# Patient Record
Sex: MALE | Employment: UNEMPLOYED | URBAN - METROPOLITAN AREA
[De-identification: names, ages, dates, MRNs, and addresses within clinical notes are randomized per-mention and may not be internally consistent; named-entity substitution may affect disease eponyms.]

---

## 2018-09-04 ENCOUNTER — TRANSFERRED RECORDS (OUTPATIENT)
Dept: HEALTH INFORMATION MANAGEMENT | Facility: CLINIC | Age: 48
End: 2018-09-04

## 2018-09-27 ENCOUNTER — TRANSFERRED RECORDS (OUTPATIENT)
Dept: HEALTH INFORMATION MANAGEMENT | Facility: CLINIC | Age: 48
End: 2018-09-27

## 2018-10-08 NOTE — TELEPHONE ENCOUNTER
FUTURE VISIT INFORMATION      FUTURE VISIT INFORMATION:    Date: 10/16/18    Time: 800am    Location: [facility]  REFERRAL INFORMATION:    Referring provider:  LOVE BARNES    Referring providers clinic:  [facility]    Reason for visit/diagnosis  Ptosis repair    RECORDS REQUESTED FROM:       Clinic name Comments Records Status Imaging Status   [facility] Notes sent to HIM on 10/8/18 Sent to HIM

## 2018-10-16 ENCOUNTER — PRE VISIT (OUTPATIENT)
Dept: OPHTHALMOLOGY | Facility: CLINIC | Age: 48
End: 2018-10-16

## 2018-11-15 ENCOUNTER — OFFICE VISIT (OUTPATIENT)
Dept: OPHTHALMOLOGY | Facility: CLINIC | Age: 48
End: 2018-11-15
Payer: COMMERCIAL

## 2018-11-15 ENCOUNTER — DOCUMENTATION ONLY (OUTPATIENT)
Dept: OPHTHALMOLOGY | Facility: CLINIC | Age: 48
End: 2018-11-15

## 2018-11-15 VITALS — HEIGHT: 73 IN | BODY MASS INDEX: 19.88 KG/M2 | WEIGHT: 150 LBS

## 2018-11-15 DIAGNOSIS — G71.00 MUSCULAR DYSTROPHY (H): ICD-10-CM

## 2018-11-15 DIAGNOSIS — H02.403 PTOSIS OF BOTH EYELIDS: Primary | ICD-10-CM

## 2018-11-15 RX ORDER — MIRTAZAPINE 30 MG/1
30 TABLET, FILM COATED ORAL AT BEDTIME
Refills: 4 | COMMUNITY
Start: 2018-02-19

## 2018-11-15 RX ORDER — GLIMEPIRIDE 2 MG/1
2 TABLET ORAL
Refills: 2 | COMMUNITY
Start: 2017-11-26

## 2018-11-15 RX ORDER — FLUTICASONE PROPIONATE 50 MCG
SPRAY, SUSPENSION (ML) NASAL
Refills: 0 | COMMUNITY
Start: 2018-01-10

## 2018-11-15 RX ORDER — GABAPENTIN 300 MG/1
600 CAPSULE ORAL DAILY
Refills: 3 | COMMUNITY
Start: 2017-11-21

## 2018-11-15 RX ORDER — ATORVASTATIN CALCIUM 40 MG/1
TABLET, FILM COATED ORAL DAILY
Refills: 3 | COMMUNITY
Start: 2017-11-21

## 2018-11-15 ASSESSMENT — TONOMETRY
OD_IOP_MMHG: 15
OS_IOP_MMHG: 12
IOP_METHOD: ICARE

## 2018-11-15 ASSESSMENT — VISUAL ACUITY
OS_CC+: -2
OD_CC+: -1
METHOD: SNELLEN - LINEAR
CORRECTION_TYPE: GLASSES
OD_CC: 20/125
OS_CC: 20/40

## 2018-11-15 ASSESSMENT — CONF VISUAL FIELD
OD_SUPERIOR_TEMPORAL_RESTRICTION: 3
OD_SUPERIOR_NASAL_RESTRICTION: 3
OS_SUPERIOR_TEMPORAL_RESTRICTION: 3
OD_INFERIOR_NASAL_RESTRICTION: 3
OD_INFERIOR_TEMPORAL_RESTRICTION: 3
OS_INFERIOR_NASAL_RESTRICTION: 3

## 2018-11-15 ASSESSMENT — EXTERNAL EXAM - RIGHT EYE: OD_EXAM: SIGNIFICANT FRONTALIS RECRUITMENT

## 2018-11-15 ASSESSMENT — EXTERNAL EXAM - LEFT EYE: OS_EXAM: SIGNIFICANT FRONTALIS RECRUITMENT

## 2018-11-15 ASSESSMENT — SLIT LAMP EXAM - LIDS
COMMENTS: DERMATOCHALASIS, PTOSIS
COMMENTS: DERMATOCHALASIS, PTOSIS

## 2018-11-15 NOTE — PROGRESS NOTES
Met with patient to schedule surgery with Dr. Jayden Feldman.    Surgery was scheduled on 12/13 at [facility].    Patient will have H&P at [facility]  Post-Op care appointment was scheduled on 12/28  Patient is aware a / is needed day of surgery.   Patient received surgery packet has my direct contact information for any further questions.

## 2018-11-15 NOTE — PROGRESS NOTES
Oculoplastic Clinic New Patient    Patient: Catrachito Palmer MRN# [id number]   YOB: 1970 Age: 48 year old   Date of Visit: Nov 15, 2018    CC: Droopy eyelids obstructing vision.  Chief Complaints and History of Present Illnesses   Patient presents with     Droopy Eye Lid             HPI:     Catrachito Palmer is a 48 year old male who has noted gradual onset of droopy eyelids over the past years. Referred by Dr. Mendez, optometrist at [facility]. The droopy eyelid is interfering with activities of daily living including driving, and reading. The patient denies double vision, variability of the eyelid position, or dry eye symptoms. Patient with history of muscular dystrophy. He lifts his eyelids with his fingers to see.     History of VIVIAN injury years ago, pt poor historian, wound was not sutured.  CEIOL each eye 2015.    EXAM:   MRD1: 0/0 mm  Levator function 5/5    VISUAL FIELD:  Right eye untaped: 7 degrees Right eye taped: 48 degrees  Left eye untaped: 10 degrees Left eye taped: 43 degrees    Assessment & Plan     Catrachito Palmer is a 48 year old male with the following diagnoses:   1. Ptosis of both eyelids    2. Muscular dystrophy       Bilateral ptosis repair with frontalis sling (small skin ellipse)      Discussed r/b/a  ANTICOAGULATION:  None     PHOTOS DEMONSTRATE:  Myogenic blepharoptosis      Nelia Last MD  Ophthalmology Resident, PGY-2    Attending Physician Attestation:  Complete documentation of historical and exam elements from today's encounter can be found in the full encounter summary report (not reduplicated in this progress note).  I personally obtained the chief complaint(s) and history of present illness.  I confirmed and edited as necessary the review of systems, past medical/surgical history, family history, social history, and examination findings as documented by others; and I examined the patient myself.  I personally reviewed the relevant tests, images, and reports  as documented above.  I formulated and edited as necessary the assessment and plan and discussed the findings and management plan with the patient and family. - Jayden Feldman MD          Today with Catrachito Palmer, I reviewed the indications, risks, benefits, and alternatives of the proposed surgical procedure including, but not limited to, failure obtain the desired result  and need for additional surgery, bleeding, infection, loss of vision, loss of the eye, and the remote possibility of permanent damage to any organ system or death with the use of anesthesia.  I provided multiple opportunities for the questions, answered all questions to the best of my ability, and confirmed that my answers and my discussion were understood.

## 2018-11-15 NOTE — NURSING NOTE
Chief Complaints and History of Present Illnesses   Patient presents with     Droopy Eye Lid     HPI    Affected eye(s):  Both   Location:  Upper   Symptoms:     No blurred vision   No double vision   No tearing   No Dryness         Do you have eye pain now?:  No      Comments:    Droopy BUL since 1995, gradually getting worse over time, affecting vision, difficult to read, drive, and watch TV    Manuela Aparicio November 15, 2018 10:16 AM

## 2018-11-15 NOTE — LETTER
11/15/2018         RE:  :  MRN: Catrachito Palmer  1970  [id number]     Dear Dr. Mendez,    Thank you for asking me to see your patient, Catrachito Palmer, for an oculoplastic   consultation.  My assessment and plan are below.  For further details, please see my attached clinic note.             HPI:     Catrachito Palmer is a 48 year old male who has noted gradual onset of droopy eyelids over the past years. Referred by Dr. Mendez, optometrist at [facility]. The droopy eyelid is interfering with activities of daily living including driving, and reading. The patient denies double vision, variability of the eyelid position, or dry eye symptoms. Patient with history of muscular dystrophy. He lifts his eyelids with his fingers to see.     History of VIVIAN injury years ago, pt poor historian, wound was not sutured.  CEIOL each eye .    EXAM:   MRD1: 0/0 mm  Levator function 5/5    VISUAL FIELD:  Right eye untaped: 7 degrees Right eye taped: 48 degrees  Left eye untaped: 10 degrees Left eye taped: 43 degrees    Assessment & Plan     Catrachito Palmer is a 48 year old male with the following diagnoses:   1. Ptosis of both eyelids    2. Muscular dystrophy       Bilateral ptosis repair with frontalis sling (small skin ellipse)      Discussed r/b/a  ANTICOAGULATION:  None      Again, thank you for allowing me to participate in the care of your patient.      Sincerely,    Jayden Feldman MD  Department of Ophthalmology and Visual Neurosciences  [facility]    CC: Sylvain Mendez,   [facility]  [address] [place] [address]  VIA Facsimile: [phone]

## 2018-11-15 NOTE — MR AVS SNAPSHOT
"              After Visit Summary   11/15/2018    Catrachito Palmer    MRN: [id number]           Patient Information     Date Of Birth          1970        Visit Information        Provider Department      11/15/2018 10:00 AM Jayden Feldman MD [facility]        Today's Diagnoses     Ptosis of both eyelids    -  1    Muscular dystrophy           Follow-ups after your visit        Your next 10 appointments already scheduled     Dec 28, 2018 11:00 AM CST   (Arrive by 10:45 AM)   Post-Op with MD THANG [facility]hthal[facility] ([facility])    [address] [place] [id number] [phone]              Who to contact     Please call your clinic at [phone] to:    Ask questions about your health    Make or cancel appointments    Discuss your medicines    Learn about your test results    Speak to your doctor            Additional Information About Your Visit        MyChart Information     [employer] is an electronic gateway that provides easy, online access to your medical records. With "[employer] you can request a clinic appointment, read your test results, renew a prescription or communicate with your care team.     To sign up for [employer] visit the website at [email or web address]   You will be asked to enter the access code listed below, as well as some personal information. Please follow the directions to create your username and password.     Your access code is: [email or web address]  Expires: 2018  5:31 AM     Your access code will  in 90 days. If you need help or a new code, please contact your [facility] or call [phone] for assistance.        Care EveryWhere ID     This is your Care EveryWhere ID. This could be used by other organizations to access your [place] medical records  [id number]        Your Vitals Were     Height BMI (Body Mass Index)                1.854 m (6' 1\") 19.79 " kg/m2           Blood Pressure from Last 3 Encounters:   No data found for BP    Weight from Last 3 Encounters:   11/15/18 68 kg (150 lb)              We Performed the Following     External Photos OU (both eyes)     Melton VF Ptosis OU     Callie-Operative Worksheet (Plastics)        Primary Care Provider    None Specified       No primary provider on file.        Equal Access to Services     [facility] BRUNA : Hadii aad ku hadasho Soomaali, waaxda luqadaha, qaybta kaalmada bethda, dayo brunoshawndinorah sol . So [facility] [phone].    ATENCIÓN: Si habla español, tiene a tom disposición servicios gratuitos de asistencia lingüística. Llame al [phone].    We comply with applicable federal civil rights laws and [place] laws. We do not discriminate on the basis of race, color, national origin, age, disability, sex, sexual orientation, or gender identity.            Thank you!     Thank you for choosing [facility] OPHTHALMOLOGY  for your care. Our goal is always to provide you with excellent care. Hearing back from our patients is one way we can continue to improve our services. Please take a few minutes to complete the written survey that you may receive in the mail after your visit with us. Thank you!             Your Updated Medication List - Protect others around you: Learn how to safely use, store and throw away your medicines at www.disposemymeds.org.          This list is accurate as of 11/15/18 11:19 AM.  Always use your most recent med list.                   Brand Name Dispense Instructions for use Diagnosis    atorvastatin 40 MG tablet    LIPITOR          fluticasone 50 MCG/ACT spray    FLONASE     SHAKE LQ AND U 2 SPRAYS IEN D        gabapentin 300 MG capsule    NEURONTIN          glimepiride 2 MG tablet    AMARYL          GLIPIZIDE PO           METFORMIN HCL PO           mirtazapine 30 MG tablet    REMERON

## 2018-12-12 ENCOUNTER — ANESTHESIA EVENT (OUTPATIENT)
Dept: SURGERY | Facility: AMBULATORY SURGERY CENTER | Age: 48
End: 2018-12-12

## 2018-12-13 ENCOUNTER — ANESTHESIA (OUTPATIENT)
Dept: SURGERY | Facility: AMBULATORY SURGERY CENTER | Age: 48
End: 2018-12-13

## 2018-12-13 ENCOUNTER — HOSPITAL ENCOUNTER (OUTPATIENT)
Facility: AMBULATORY SURGERY CENTER | Age: 48
End: 2018-12-13
Attending: OPHTHALMOLOGY
Payer: COMMERCIAL

## 2018-12-13 VITALS
TEMPERATURE: 97.1 F | OXYGEN SATURATION: 100 % | DIASTOLIC BLOOD PRESSURE: 77 MMHG | SYSTOLIC BLOOD PRESSURE: 141 MMHG | RESPIRATION RATE: 14 BRPM

## 2018-12-13 DIAGNOSIS — Z98.890 POSTOPERATIVE EYE STATE: Primary | ICD-10-CM

## 2018-12-13 LAB — GLUCOSE BLDC GLUCOMTR-MCNC: 80 MG/DL (ref 70–99)

## 2018-12-13 DEVICE — EYE IMP FRONTALIS PTOSIS SET 585192: Type: IMPLANTABLE DEVICE | Site: EYELID | Status: FUNCTIONAL

## 2018-12-13 RX ORDER — SODIUM CHLORIDE, SODIUM LACTATE, POTASSIUM CHLORIDE, CALCIUM CHLORIDE 600; 310; 30; 20 MG/100ML; MG/100ML; MG/100ML; MG/100ML
INJECTION, SOLUTION INTRAVENOUS CONTINUOUS
Status: DISCONTINUED | OUTPATIENT
Start: 2018-12-13 | End: 2018-12-13 | Stop reason: HOSPADM

## 2018-12-13 RX ORDER — PROPOFOL 10 MG/ML
INJECTION, EMULSION INTRAVENOUS PRN
Status: DISCONTINUED | OUTPATIENT
Start: 2018-12-13 | End: 2018-12-13

## 2018-12-13 RX ORDER — MEPERIDINE HYDROCHLORIDE 25 MG/ML
12.5 INJECTION INTRAMUSCULAR; INTRAVENOUS; SUBCUTANEOUS
Status: DISCONTINUED | OUTPATIENT
Start: 2018-12-13 | End: 2018-12-14 | Stop reason: HOSPADM

## 2018-12-13 RX ORDER — NALOXONE HYDROCHLORIDE 0.4 MG/ML
.1-.4 INJECTION, SOLUTION INTRAMUSCULAR; INTRAVENOUS; SUBCUTANEOUS
Status: DISCONTINUED | OUTPATIENT
Start: 2018-12-13 | End: 2018-12-14 | Stop reason: HOSPADM

## 2018-12-13 RX ORDER — OXYCODONE HYDROCHLORIDE 5 MG/1
5 TABLET ORAL EVERY 4 HOURS PRN
Status: DISCONTINUED | OUTPATIENT
Start: 2018-12-13 | End: 2018-12-14 | Stop reason: HOSPADM

## 2018-12-13 RX ORDER — GENTAMICIN 40 MG/ML
INJECTION, SOLUTION INTRAMUSCULAR; INTRAVENOUS CONTINUOUS PRN
Status: COMPLETED | OUTPATIENT
Start: 2018-12-13 | End: 2018-12-13

## 2018-12-13 RX ORDER — ERYTHROMYCIN 5 MG/G
OINTMENT OPHTHALMIC PRN
Status: DISCONTINUED | OUTPATIENT
Start: 2018-12-13 | End: 2018-12-13 | Stop reason: HOSPADM

## 2018-12-13 RX ORDER — LIDOCAINE 40 MG/G
CREAM TOPICAL
Status: DISCONTINUED | OUTPATIENT
Start: 2018-12-13 | End: 2018-12-13 | Stop reason: HOSPADM

## 2018-12-13 RX ORDER — LIDOCAINE HYDROCHLORIDE 20 MG/ML
INJECTION, SOLUTION INFILTRATION; PERINEURAL PRN
Status: DISCONTINUED | OUTPATIENT
Start: 2018-12-13 | End: 2018-12-13

## 2018-12-13 RX ORDER — ONDANSETRON 4 MG/1
4 TABLET, ORALLY DISINTEGRATING ORAL EVERY 30 MIN PRN
Status: DISCONTINUED | OUTPATIENT
Start: 2018-12-13 | End: 2018-12-14 | Stop reason: HOSPADM

## 2018-12-13 RX ORDER — HYDROMORPHONE HYDROCHLORIDE 1 MG/ML
.3-.5 INJECTION, SOLUTION INTRAMUSCULAR; INTRAVENOUS; SUBCUTANEOUS EVERY 10 MIN PRN
Status: DISCONTINUED | OUTPATIENT
Start: 2018-12-13 | End: 2018-12-14 | Stop reason: HOSPADM

## 2018-12-13 RX ORDER — ERYTHROMYCIN 5 MG/G
OINTMENT OPHTHALMIC
Qty: 3.5 G | Refills: 0 | Status: SHIPPED | OUTPATIENT
Start: 2018-12-13 | End: 2018-12-23

## 2018-12-13 RX ORDER — CEPHALEXIN 500 MG/1
500 CAPSULE ORAL 2 TIMES DAILY
Qty: 20 CAPSULE | Refills: 0 | Status: SHIPPED | OUTPATIENT
Start: 2018-12-13 | End: 2018-12-23

## 2018-12-13 RX ORDER — FENTANYL CITRATE 50 UG/ML
25-50 INJECTION, SOLUTION INTRAMUSCULAR; INTRAVENOUS
Status: DISCONTINUED | OUTPATIENT
Start: 2018-12-13 | End: 2018-12-13 | Stop reason: HOSPADM

## 2018-12-13 RX ORDER — GABAPENTIN 300 MG/1
300 CAPSULE ORAL ONCE
Status: COMPLETED | OUTPATIENT
Start: 2018-12-13 | End: 2018-12-13

## 2018-12-13 RX ORDER — HYDROCODONE BITARTRATE AND ACETAMINOPHEN 5; 325 MG/1; MG/1
1 TABLET ORAL
Status: DISCONTINUED | OUTPATIENT
Start: 2018-12-13 | End: 2018-12-14 | Stop reason: HOSPADM

## 2018-12-13 RX ORDER — ONDANSETRON 2 MG/ML
4 INJECTION INTRAMUSCULAR; INTRAVENOUS EVERY 30 MIN PRN
Status: DISCONTINUED | OUTPATIENT
Start: 2018-12-13 | End: 2018-12-14 | Stop reason: HOSPADM

## 2018-12-13 RX ORDER — ACETAMINOPHEN 325 MG/1
975 TABLET ORAL ONCE
Status: COMPLETED | OUTPATIENT
Start: 2018-12-13 | End: 2018-12-13

## 2018-12-13 RX ORDER — SODIUM CHLORIDE, SODIUM LACTATE, POTASSIUM CHLORIDE, CALCIUM CHLORIDE 600; 310; 30; 20 MG/100ML; MG/100ML; MG/100ML; MG/100ML
INJECTION, SOLUTION INTRAVENOUS CONTINUOUS
Status: DISCONTINUED | OUTPATIENT
Start: 2018-12-13 | End: 2018-12-14 | Stop reason: HOSPADM

## 2018-12-13 RX ADMIN — LIDOCAINE HYDROCHLORIDE 60 MG: 20 INJECTION, SOLUTION INFILTRATION; PERINEURAL at 09:35

## 2018-12-13 RX ADMIN — ACETAMINOPHEN 975 MG: 325 TABLET ORAL at 07:56

## 2018-12-13 RX ADMIN — PROPOFOL 30 MG: 10 INJECTION, EMULSION INTRAVENOUS at 09:37

## 2018-12-13 RX ADMIN — PROPOFOL 50 MG: 10 INJECTION, EMULSION INTRAVENOUS at 09:35

## 2018-12-13 RX ADMIN — GABAPENTIN 300 MG: 300 CAPSULE ORAL at 07:56

## 2018-12-13 RX ADMIN — SODIUM CHLORIDE, SODIUM LACTATE, POTASSIUM CHLORIDE, CALCIUM CHLORIDE: 600; 310; 30; 20 INJECTION, SOLUTION INTRAVENOUS at 07:57

## 2018-12-13 ASSESSMENT — LIFESTYLE VARIABLES: TOBACCO_USE: 1

## 2018-12-13 NOTE — OP NOTE
PREOPERATIVE DIAGNOSIS: Bilateral severe myogenic ptosis.   POSTOPERATIVE DIAGNOSIS: Bilateral severe myogenic ptosis.   PROCEDURES PERFORMED: Bilateral upper eyelid ptosis repair with frontalis silicone sling.   SURGEON: Jayden Feldman MD  ASSISTANTS: Emiliano Turner MD  ANESTHESIA: MAC with local infiltration of a 50/50 mixture of 1.5% lidocaine with epinephrine and 0.5% Marcaine.   COMPLICATIONS: None.   ESTIMATED BLOOD LOSS: Less than 5 mL.   HISTORY:  Catrachito Palmer presents today with severe upper eyelid ptosis interfering with the superior visual field and activities of daily living. After the risks, benefits and alternatives to the proposed procedure were explained, informed consent was obtained.   DESCRIPTION OF PROCEDURE: Catrachito Palmer  was brought to the operating room and placed supine on the operating table. Under MAC anesthesia, the bilateral upper lid crease and an ellipse of excess skin and central brow were marked with a marking pen and infiltrated with local anesthetic. The area  was prepped and draped in the typical sterile ophthalmic fashion. Attention was directed to the right  side. The ellipse that had been marked out was incised with a 15 blade and dissection carried down to the orbicularis with high temperature cautery. Dissection was carried to the superior tarsal plate. The brow incision was made with a 15 blade and deepened with the Ulloa scissors, a subcutaneous pocket was dissected superiorly with the Ulloa scissors. A [employer] wallace silicone sling set was used. The preplaced needles were trimmed off and the silicone secured to the superior tarsal plate medially and laterally with 5-0 Mersilene sutures. Each end of the sling was then passed through the upper eyelid tissues in a post-septal plane to the brow incision using a curved abdominal closure needle. The upper eyelid crease incision was closed with interrupted buried deep 6-0 Vicryl sutures taking bites of the superior  tarsus and running 6-0 plain gut suture to close  the skin edges. The ends of the silicone wallace were then placed through a Watzke sleeve and tightened to bring the lid into a normal height and contour. A 5-0 Mersilene suture was passed around the sleeve and tied in a permanent fashion. The ends of the sling were trimmed and the Watzke sleeve and sling ends were tucked into the subcutaneous pocket. The brow incision was closed with interrupted buried 6-0 Vicryl sutures. Skin was closed with interrupted 6-0 plain gut sutures. Ophthalmic antibiotic ointment was applied to the incisions into the eye. Attention directed to the right side where the same procedure was performed. Through out the procedure the eyelid height was checked to assess symmetry. The patient tolerated the procedure well and left the operating room in stable condition.   Jayden Feldman MD

## 2018-12-13 NOTE — DISCHARGE INSTRUCTIONS
[facility]  Home Care Following Anesthesia  For 24 hours after surgery:  1. Get plenty of rest.  A responsible adult must stay with you for at least 24 hours after you leave the surgery center.  2. Do not drive or use heavy equipment.  If you have weakness or tingling, don't drive or use heavy equipment until this feeling goes away.   3. Do not drink alcohol.   4. Avoid strenuous or risky activities.  Ask for help when climbing stairs.  5. You may feel lightheaded.  IF so, sit for a few minutes before standing.  Have someone help you get up.   6. If you have nausea (feel sick to your stomach): Drink only clear liquids such as apple juice, ginger ale, broth or 7-Up.  Rest may also help.  Be sure to drink enough fluids.  Move to a regular diet as you feel able.   7. You may have a slight fever.  Call the doctor if your fever is over 100 F (37.7 C) (taken under the tongue) or lasts longer than 24 hours.  8. You may have a dry mouth, a sore throat, muscle aches or trouble sleeping. These should go away after 24 hours.  9. Do not make important or legal decisions.               Tips for taking pain medications  To get the best pain relief possible, remember these points:    Take pain medications as directed, before pain becomes severe.    Pain medication can upset your stomach: taking it with food may help.    Constipation is a common side effect of pain medication. Drink plenty of  fluids.    Eat foods high in fiber. Take a stool softener if recommended by your doctor or pharmacist.    Do not drink alcohol, drive or operate machinery while taking pain medications.    Ask about other ways to control pain, such as with heat, ice or relaxation.    Tylenol/Acetaminophen Consumption  To help encourage the safe use of acetaminophen, the makers of TYLENOL  have lowered the maximum daily dose for single-ingredient Extra Strength TYLENOL  (acetaminophen) products sold in the [place] from 8  pills per day (4,000 mg) to 6 pills per day (3,000 mg). The dosing interval has also changed from 2 pills every 4-6 hours to 2 pills every 6 hours.    If you feel your pain relief is insufficient, you may take Tylenol/Acetaminophen in addition to your narcotic pain medication.     Be careful not to exceed 3,000 mg of Tylenol/Acetaminophen in a 24 hour period from all sources.    If you are taking extra strength Tylenol/acetaminophen (500 mg), the maximum dose is 6 tablets in 24 hours.    If you are taking regular strength acetaminophen (325 mg), the maximum dose is 9 tablets in 24 hours.    Call a doctor for any of the followin. Signs of infection (fever, growing tenderness at the surgery site, a large amount of drainage or bleeding, severe pain, foul-smelling drainage, redness, swelling).  2. It has been over 8 to 10 hours since surgery and you are still not able to urinate (pass water).  3. Headache for over 24 hours.  4. Numbness, tingling or weakness the day after surgery (if you had spinal anesthesia).  Your doctor is:       Dr. Jayden Feldman, Ophthalmology: [phone]               Or dial [phone] and ask for the resident on call for:  Ophthalmology  For emergency care, call the:  [place] [facility]:  [phone] (TTY for hearing impaired: [phone])            Post-operative Instructions  Ophthalmic Plastic and Reconstructive Surgery    Jayden Feldman M.D.     All instructions apply to the operated eye(s) or eyelid(s).    Wound care and personal care  ? If a patch or bandage has been placed, please leave this in place until seen by your physician. Ensure that the bandage does not get wet when you take a shower.  ? Apply ice compresses 15 minutes on 15 minutes off while awake for 2 days, then switch to warm water compresses 4 times a day until seen by your physician. For warm packs you can place a cup of dry uncooked rice in a clean cotton sock. Then place sock in microwave 30  seconds to one minute. Next place the warm sock into a plastic bag and wrap the bag with clean warm wet washcloth and place over operated eye.    ? You may shower or wash your hair the day after surgery. Do not bathe or go swimming for 1 week to prevent contamination of your wounds.  ? Do not apply make-up to the eyes or eyelids for 2 weeks after surgery.  ? Expect some swelling, bruising, black eye (even into the lower eyelids and cheeks). Also expect serum caking, crusting and discharge from the eye and/or incisions. A small amount of surface bleeding is normal for the first 48 hours.  ? Your eye(s) and eyelid(s) may be painful and tender. This is normal after surgery.      Contact information and follow-up  ? Return to the Eye Clinic for a follow-up appointment with your physician as  scheduled. If no appointment has been scheduled:   - [facility] eye clinic: [phone] for an appointment with Dr. Feldman within 1 to 2 weeks from your date of surgery.   -  [facility] eye clinic: [phone] for an appointment with Dr. Feldman within 1 to 2 weeks from your date of surgery.     ? For severe pain, bleeding, or loss of vision, call the [facility] at [phone] or [facility] at [phone].     After hours or on weekends and holidays, call [phone] and ask to speak with the ophthalmologist on call.    An on call person can be reached after hours for concerns. The on call doctor should not call in medication refill requests after hours or on weekends, so please plan accordingly. An effort has been made to provide adequate pain medications following every surgery, and refills will not be provided in most instances. Narcotic pain medications cannot be called in.     Activity restrictions and driving  ? Avoid heavy lifting, bending, exercise or strenuous activity for 1 week after surgery.  You may resume other activities and return to work  as tolerated.  ? You may not resume driving until have you stopped using narcotic pain medications (such as Norco, Percocet, Tylenol #3).    Medications  ? Restart all your regular home medications and eye drops. If you take Plavix or  Aspirin on a regular basis, wait for 72 hours after your surgery before restarting these in order to decrease the risk of bleeding complications.  ? Avoid aspirin and aspirin-like medications (Motrin, Aleve, Ibuprofen, Devorah-  Arcadia etc) for 72 hours to reduce the risk of bleeding. You may take Tylenol  (acetaminophen) for pain.  ? In addition to your home medications, take the following post-operative medications as prescribed by your physician.    ? Apply antibiotic ointment to all sutures three times a day, and into the operated eye(s) at night.  ? Take antibiotic (Keflex) capsules or tablets twice a day for 10 days as directed

## 2018-12-13 NOTE — ANESTHESIA POSTPROCEDURE EVALUATION
Anesthesia POST Procedure Evaluation    Patient: Catrachito Palmer   MRN:     [id number] Gender:   male   Age:    48 year old :      1970        Preoperative Diagnosis: Ptosis   Procedure(s):  Bilateral Upper Eyelid Ptosis Repair with Frontalis Sling   Postop Comments: No value filed.       Anesthesia Type:  MAC    Reportable Event: NO     PAIN: Uncomplicated   Sign Out status: Comfortable, Well controlled pain     PONV: No PONV   Sign Out status:  No Nausea or Vomiting     Neuro/Psych: Uneventful perioperative course   Sign Out Status: Preoperative baseline; Age appropriate mentation     Airway/Resp.: Uneventful perioperative course   Sign Out Status: Non labored breathing, age appropriate RR; Resp. Status within EXPECTED Parameters     CV: Uneventful perioperative course   Sign Out status: Appropriate BP and perfusion indices; Appropriate HR/Rhythm     Disposition:   Sign Out in:  PACU  Disposition:  Phase II; Home  Recovery Course: Uneventful  Follow-Up: Not required           Last Anesthesia Record Vitals:  [facility] VITALS  2018 0955 - 2018 1055      2018             Pulse:  60    SpO2:  99 %    Resp Rate (observed):  2  (Abnormal)     Resp Rate (set):  10          Last PACU/Preop Vitals:  Vitals:    18 0753 18 1027   BP: (!) 128/93 141/77   Resp: 16 14   Temp: 36.3  C (97.3  F) 36.2  C (97.1  F)   SpO2: 97% 100%         Electronically Signed By: Miguel House MD, 2018, 11:29 AM

## 2018-12-13 NOTE — BRIEF OP NOTE
[facility]    Brief Operative Note    Pre-operative diagnosis: Ptosis  Post-operative diagnosis Ptosis  Procedure: Procedure(s):  Bilateral Upper Eyelid Ptosis Repair with Frontalis Sling  Surgeon: Surgeon(s) and Role:     * Jayden Feldman MD - Primary   Assistant: Emiliano Turner MD  Anesthesia: Combined MAC with Local   Estimated blood loss: Minimal  Drains: None  Specimens: None  Findings:   None.  Complications: None.  Implants: Bilateral Silicone frontalis sling

## 2018-12-14 NOTE — PROGRESS NOTES
Telephone call to Catrachito Palmer    Doing well with no pain, good vision, and no bleeding. All questions were answered, he is doing well, and postoperative care was reviewed. He says mild Foreign body sensation in the right eye. Encouraged him to use artificial tears very frequently and if needed erythromycin ointment into the eye throughout the day. If it becomes painful he will call and be seen. Otherwise he is doing well.    A postop appointment has been scheduled.    Jayden Feldman

## 2018-12-17 ENCOUNTER — TELEPHONE (OUTPATIENT)
Dept: OPHTHALMOLOGY | Facility: CLINIC | Age: 48
End: 2018-12-17

## 2018-12-17 NOTE — TELEPHONE ENCOUNTER
Pt call was transferred from call center.     Pt states since surgery this last Thursday, most of the stitches from the right eye has fallen off and feels like there is something in his right eye. Pt having discomfort. I advised pt to come in to see the doctor. Pt states he is unable to come in, he lives in [place] and it takes him a few days to arrange for transportation. Pt will call Dr. Mendez [place] [facility] to see if he can follow up with them. I again advised pt that Dr. Carpenter would like to see him. He again declined and said he does not have transportation and will try to arrange an appt with a local ophthalmologist.     Payton Montanez COT 1:40 PM December 17, 2018

## 2018-12-19 ENCOUNTER — TELEPHONE (OUTPATIENT)
Dept: OPHTHALMOLOGY | Facility: CLINIC | Age: 48
End: 2018-12-19

## 2018-12-19 NOTE — TELEPHONE ENCOUNTER
Note to Dr. Turner/Dr. Feldman's RN for assistance with letter  Pavel Carvajal RN 2:01 PM 12/19/18

## 2018-12-19 NOTE — TELEPHONE ENCOUNTER
[facility] Call Center    Phone Message    May a detailed message be left on voicemail: yes    Reason for Call: Other: PT is needing Dr. Carpenter to fax the specific weight restrictions of 10lbs to work and that we couldn't work Monday and Tuesday as he had eye pain. He is needing documentation that he was seen. Please fax restrictions and reason why he couldn't work monday and Tuesday. Fax number [phone] att Imelda Hameed. Thank you     Action Taken: Message routed to:  [facility] ([facility]

## 2018-12-20 ENCOUNTER — TRANSFERRED RECORDS (OUTPATIENT)
Dept: HEALTH INFORMATION MANAGEMENT | Facility: CLINIC | Age: 48
End: 2018-12-20

## 2018-12-21 NOTE — TELEPHONE ENCOUNTER
[facility]    Phone Message    May a detailed message be left on voicemail: yes    Reason for Call: Other: 2ND REQUEST:  Pt calling again for Dr. Patino's letter that is needed for Pt's employer regarding: Pt needed to be off from work on Mon. 12/17 and Tues. 12/18/19 due to pain after surgery. Pt also reported that any restrictions MUST be specified, as well. Pt stated that this information needs to be sent ASAP to pt's employer: [employer], attn: Imelda Hameed () at fax# [phone].  Thank you!     Action Taken: Message routed to:  [facility] E[facility] GENERAL

## 2018-12-21 NOTE — TELEPHONE ENCOUNTER
Note to Dr. Feldman's RN and dr. Blanco to assist in letter  Pavel Carvajal RN 2:32 PM 12/21/18

## 2018-12-26 NOTE — TELEPHONE ENCOUNTER
Note faxed to pt's employer as requested    Nathaniel Blanco MD, DUONG  Oculofacial Plastics and Orbit Surgery Fellow

## 2018-12-28 ENCOUNTER — OFFICE VISIT (OUTPATIENT)
Dept: OPHTHALMOLOGY | Facility: CLINIC | Age: 48
End: 2018-12-28
Payer: COMMERCIAL

## 2018-12-28 DIAGNOSIS — H02.403 PTOSIS OF BOTH EYELIDS: Primary | ICD-10-CM

## 2018-12-28 ASSESSMENT — VISUAL ACUITY
OS_CC+: -2
OS_CC: 20/40
OD_CC: 20/125
METHOD: SNELLEN - LINEAR
CORRECTION_TYPE: GLASSES

## 2018-12-28 ASSESSMENT — TONOMETRY
OD_IOP_MMHG: 14
IOP_METHOD: ICARE
OS_IOP_MMHG: 14

## 2018-12-28 NOTE — PROGRESS NOTES
Assessment    Catrachito Palmer is a 48 year old male with the following diagnoses:   No diagnosis found.     S/p frontalis sling BUL  -Incisions healing well  -Lids in excellent position especially w/ frontalis   -Pt notes symptomatic improvement in vision and visual field      PLAN:    Finish antibiotic ointment to the incision site BID  -- Then use Vaseline/Aquaphor/Vit E Oil to incisions BID  Massage along the incision 2-3 x daily   Warm soaks 3-4x daily until all edema and ecchymosis resolve  Alternate with ice packs for comfort as needed    Return to clinic as needed, pt has f/u in [facility] in March 2019, pt will call us if there are any issues    Nathaniel Blanco MD, DUONG  Oculofacial Plastics and Orbit Surgery Fellow      Attestation:  Complete documentation of historical and exam elements from today's encounter can be found in the full encounter summary report (not reduplicated in this progress note).  I personally obtained the chief complaint(s) and history of present illness.  I confirmed and edited as necessary the review of systems, past medical/surgical history, family history, social history, and examination findings as documented by others; and I examined the patient myself.  I personally reviewed the relevant tests, images, and reports as documented above.  I formulated and edited as necessary the assessment and plan and discussed the findings and management plan with the patient and family. I personally reviewed the ophthalmic test(s) associated with this encounter, agree with the interpretation(s) as documented by the resident/fellow, and have edited the corresponding report(s) as necessary.   -Nathaniel Blanco MD, DUONG

## 2018-12-28 NOTE — NURSING NOTE
Chief Complaints and History of Present Illnesses   Patient presents with     Post Op (Ophthalmology) Both Eyes     POW#2 s/p BUL ptosis repair with frontalis silicone sling     Chief Complaint(s) and History of Present Illness(es)     Post Op (Ophthalmology) Both Eyes     Laterality: both eyes    Onset: 2 weeks ago    Comments: POW#2 s/p BUL ptosis repair with frontalis silicone sling              Comments     Patient notes that he is doing okay, wondering if he has any stitches left, had a problem with scratchiness in the RE and went to local doctor in [facility] and was told nothing was wrong with the RE, notes he has no symptoms.     Manuela Aparicio December 28, 2018 11:10 AM      Manuela Aparicio December 28, 2018 11:10 AM

## 2023-01-12 ENCOUNTER — TRANSFERRED RECORDS (OUTPATIENT)
Dept: HEALTH INFORMATION MANAGEMENT | Facility: CLINIC | Age: 53
End: 2023-01-12

## 2023-01-13 ENCOUNTER — TRANSCRIBE ORDERS (OUTPATIENT)
Dept: OTHER | Age: 53
End: 2023-01-13

## 2023-01-13 DIAGNOSIS — G71.11 MYOTONIC DYSTROPHY (H): Primary | ICD-10-CM

## 2023-01-20 NOTE — TELEPHONE ENCOUNTER
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

## 2023-02-13 NOTE — ANESTHESIA CARE TRANSFER NOTE
Patient: Catrachito Palmer    Procedure(s):  Bilateral Upper Eyelid Ptosis Repair with Frontalis Sling    Diagnosis: Ptosis  Diagnosis Additional Information: No value filed.    Anesthesia Type:   No value filed.     Note:  Airway :Room Air  Patient transferred to:Phase II  Comments: VSS/WNL. Responds well.Handoff Report: Identifed the Patient, Identified the Reponsible Provider, Reviewed the pertinent medical history, Discussed the surgical course, Reviewed Intra-OP anesthesia mangement and issues during anesthesia, Set expectations for post-procedure period and Allowed opportunity for questions and acknowledgement of understanding      Vitals: (Last set prior to Anesthesia Care Transfer)    CRNA VITALS  12/13/2018 0955 - 12/13/2018 1030      12/13/2018             Pulse:  60    SpO2:  99 %    Resp Rate (observed):  2  (Abnormal)     Resp Rate (set):  10                Electronically Signed By: ALPESH Geller CRNA  December 13, 2018  10:30 AM   Home

## 2023-03-24 ENCOUNTER — PRE VISIT (OUTPATIENT)
Dept: NEUROSURGERY | Facility: CLINIC | Age: 53
End: 2023-03-24

## (undated) DEVICE — BLADE KNIFE SURG 15 371115

## (undated) DEVICE — LINEN TOWEL PACK X5 5464

## (undated) DEVICE — GLOVE PROTEXIS MICRO 7.5  2D73PM75

## (undated) DEVICE — SYR 03ML LL W/O NDL 309657

## (undated) DEVICE — SU NDL MAYO 1824-4

## (undated) DEVICE — NDL 30GA 0.5" 305106

## (undated) DEVICE — ESU EYE HIGH TEMP 65410-183

## (undated) DEVICE — EYE PREP BETADINE 5% SOLUTION 30ML 0065-0411-30

## (undated) DEVICE — PACK MINOR EYE CUSTOM ASC

## (undated) DEVICE — PEN MARKING SKIN TYCO DEVON DUAL TIP 31145868

## (undated) RX ORDER — GENTAMICIN 40 MG/ML
INJECTION, SOLUTION INTRAMUSCULAR; INTRAVENOUS
Status: DISPENSED
Start: 2018-12-13

## (undated) RX ORDER — GABAPENTIN 300 MG/1
CAPSULE ORAL
Status: DISPENSED
Start: 2018-12-13

## (undated) RX ORDER — LIDOCAINE HYDROCHLORIDE 20 MG/ML
INJECTION, SOLUTION EPIDURAL; INFILTRATION; INTRACAUDAL; PERINEURAL
Status: DISPENSED
Start: 2018-12-13

## (undated) RX ORDER — PROPOFOL 10 MG/ML
INJECTION, EMULSION INTRAVENOUS
Status: DISPENSED
Start: 2018-12-13

## (undated) RX ORDER — ACETAMINOPHEN 325 MG/1
TABLET ORAL
Status: DISPENSED
Start: 2018-12-13